# Patient Record
Sex: FEMALE | Race: WHITE | NOT HISPANIC OR LATINO | URBAN - METROPOLITAN AREA
[De-identification: names, ages, dates, MRNs, and addresses within clinical notes are randomized per-mention and may not be internally consistent; named-entity substitution may affect disease eponyms.]

---

## 2018-09-12 ENCOUNTER — IMPORTED ENCOUNTER (OUTPATIENT)
Dept: URBAN - METROPOLITAN AREA CLINIC 31 | Facility: CLINIC | Age: 73
End: 2018-09-12

## 2018-09-12 PROBLEM — H25.813: Noted: 2018-09-12

## 2018-09-12 PROCEDURE — 92004 COMPRE OPH EXAM NEW PT 1/>: CPT

## 2018-09-12 PROCEDURE — 92250 FUNDUS PHOTOGRAPHY W/I&R: CPT

## 2020-08-07 NOTE — PATIENT DISCUSSION
Scleral fit today showed promising improvement to 20/40. Pt was trial framed to show how the effective VA would look and she was very happy. Will order trial lens and set dispense. Current glasses over lens will still work because a balance OS.

## 2020-08-21 NOTE — PATIENT DISCUSSION
08/21/2020 Dispensed lens today and did I/R. OK to start wearing for practice but only a few hours. Ordering with changes because this one too loose. Pt knows I/R now, so will  when it is in and a F/U is set for 2 weeks.

## 2020-10-05 NOTE — PATIENT DISCUSSION
Final lens fit today. Looks good with this lens. Suggested filling with Celluvisc for improved comfort. AT's PRN.

## 2022-04-02 ASSESSMENT — VISUAL ACUITY
OS_CC: 20/60
OD_CC: 20/25-2

## 2022-04-02 ASSESSMENT — TONOMETRY
OD_IOP_MMHG: 16
OS_IOP_MMHG: 16